# Patient Record
Sex: FEMALE | URBAN - METROPOLITAN AREA
[De-identification: names, ages, dates, MRNs, and addresses within clinical notes are randomized per-mention and may not be internally consistent; named-entity substitution may affect disease eponyms.]

---

## 2018-11-05 PROBLEM — Z00.00 ENCOUNTER FOR PREVENTIVE HEALTH EXAMINATION: Status: ACTIVE | Noted: 2018-11-05

## 2018-11-21 ENCOUNTER — EMERGENCY (EMERGENCY)
Facility: HOSPITAL | Age: 53
LOS: 1 days | Discharge: HOME | End: 2018-11-21
Attending: EMERGENCY MEDICINE
Payer: COMMERCIAL

## 2018-11-21 VITALS
WEIGHT: 214.95 LBS | DIASTOLIC BLOOD PRESSURE: 74 MMHG | RESPIRATION RATE: 18 BRPM | HEART RATE: 70 BPM | HEIGHT: 64 IN | SYSTOLIC BLOOD PRESSURE: 119 MMHG | OXYGEN SATURATION: 97 % | TEMPERATURE: 98 F

## 2018-11-21 VITALS
SYSTOLIC BLOOD PRESSURE: 132 MMHG | RESPIRATION RATE: 18 BRPM | TEMPERATURE: 98 F | OXYGEN SATURATION: 100 % | HEART RATE: 62 BPM | DIASTOLIC BLOOD PRESSURE: 79 MMHG

## 2018-11-21 LAB
ALBUMIN SERPL ELPH-MCNC: 4.6 G/DL — SIGNIFICANT CHANGE UP (ref 3.3–5)
ALP SERPL-CCNC: 85 U/L — SIGNIFICANT CHANGE UP (ref 40–120)
ALT FLD-CCNC: 12 U/L — SIGNIFICANT CHANGE UP (ref 10–45)
ANION GAP SERPL CALC-SCNC: 13 MMOL/L — SIGNIFICANT CHANGE UP (ref 5–17)
APTT BLD: 32.4 SEC — SIGNIFICANT CHANGE UP (ref 27.5–36.3)
AST SERPL-CCNC: 13 U/L — SIGNIFICANT CHANGE UP (ref 10–40)
BASOPHILS # BLD AUTO: 0.1 K/UL — SIGNIFICANT CHANGE UP (ref 0–0.2)
BASOPHILS NFR BLD AUTO: 0.8 % — SIGNIFICANT CHANGE UP (ref 0–2)
BILIRUB SERPL-MCNC: 0.4 MG/DL — SIGNIFICANT CHANGE UP (ref 0.2–1.2)
BUN SERPL-MCNC: 13 MG/DL — SIGNIFICANT CHANGE UP (ref 7–23)
CALCIUM SERPL-MCNC: 9.9 MG/DL — SIGNIFICANT CHANGE UP (ref 8.4–10.5)
CHLORIDE SERPL-SCNC: 104 MMOL/L — SIGNIFICANT CHANGE UP (ref 96–108)
CO2 SERPL-SCNC: 23 MMOL/L — SIGNIFICANT CHANGE UP (ref 22–31)
CREAT SERPL-MCNC: 0.81 MG/DL — SIGNIFICANT CHANGE UP (ref 0.5–1.3)
EOSINOPHIL # BLD AUTO: 0.1 K/UL — SIGNIFICANT CHANGE UP (ref 0–0.5)
EOSINOPHIL NFR BLD AUTO: 1.5 % — SIGNIFICANT CHANGE UP (ref 0–6)
GLUCOSE SERPL-MCNC: 89 MG/DL — SIGNIFICANT CHANGE UP (ref 70–99)
HCT VFR BLD CALC: 41.2 % — SIGNIFICANT CHANGE UP (ref 34.5–45)
HGB BLD-MCNC: 14.3 G/DL — SIGNIFICANT CHANGE UP (ref 11.5–15.5)
INR BLD: 0.99 RATIO — SIGNIFICANT CHANGE UP (ref 0.88–1.16)
LYMPHOCYTES # BLD AUTO: 1.6 K/UL — SIGNIFICANT CHANGE UP (ref 1–3.3)
LYMPHOCYTES # BLD AUTO: 21.1 % — SIGNIFICANT CHANGE UP (ref 13–44)
MCHC RBC-ENTMCNC: 28.1 PG — SIGNIFICANT CHANGE UP (ref 27–34)
MCHC RBC-ENTMCNC: 34.7 GM/DL — SIGNIFICANT CHANGE UP (ref 32–36)
MCV RBC AUTO: 81.2 FL — SIGNIFICANT CHANGE UP (ref 80–100)
MONOCYTES # BLD AUTO: 0.6 K/UL — SIGNIFICANT CHANGE UP (ref 0–0.9)
MONOCYTES NFR BLD AUTO: 8.2 % — SIGNIFICANT CHANGE UP (ref 2–14)
NEUTROPHILS # BLD AUTO: 5.2 K/UL — SIGNIFICANT CHANGE UP (ref 1.8–7.4)
NEUTROPHILS NFR BLD AUTO: 68.5 % — SIGNIFICANT CHANGE UP (ref 43–77)
PLATELET # BLD AUTO: 210 K/UL — SIGNIFICANT CHANGE UP (ref 150–400)
POTASSIUM SERPL-MCNC: 4.2 MMOL/L — SIGNIFICANT CHANGE UP (ref 3.5–5.3)
POTASSIUM SERPL-SCNC: 4.2 MMOL/L — SIGNIFICANT CHANGE UP (ref 3.5–5.3)
PROT SERPL-MCNC: 7.5 G/DL — SIGNIFICANT CHANGE UP (ref 6–8.3)
PROTHROM AB SERPL-ACNC: 11.3 SEC — SIGNIFICANT CHANGE UP (ref 10–12.9)
RBC # BLD: 5.07 M/UL — SIGNIFICANT CHANGE UP (ref 3.8–5.2)
RBC # FLD: 12.9 % — SIGNIFICANT CHANGE UP (ref 10.3–14.5)
SODIUM SERPL-SCNC: 140 MMOL/L — SIGNIFICANT CHANGE UP (ref 135–145)
WBC # BLD: 7.6 K/UL — SIGNIFICANT CHANGE UP (ref 3.8–10.5)
WBC # FLD AUTO: 7.6 K/UL — SIGNIFICANT CHANGE UP (ref 3.8–10.5)

## 2018-11-21 PROCEDURE — 71250 CT THORAX DX C-: CPT

## 2018-11-21 PROCEDURE — 85730 THROMBOPLASTIN TIME PARTIAL: CPT

## 2018-11-21 PROCEDURE — 74176 CT ABD & PELVIS W/O CONTRAST: CPT | Mod: 26

## 2018-11-21 PROCEDURE — 85610 PROTHROMBIN TIME: CPT

## 2018-11-21 PROCEDURE — 72125 CT NECK SPINE W/O DYE: CPT

## 2018-11-21 PROCEDURE — 80053 COMPREHEN METABOLIC PANEL: CPT

## 2018-11-21 PROCEDURE — 70450 CT HEAD/BRAIN W/O DYE: CPT | Mod: 26

## 2018-11-21 PROCEDURE — 85027 COMPLETE CBC AUTOMATED: CPT

## 2018-11-21 PROCEDURE — 71250 CT THORAX DX C-: CPT | Mod: 26

## 2018-11-21 PROCEDURE — 72125 CT NECK SPINE W/O DYE: CPT | Mod: 26

## 2018-11-21 PROCEDURE — 99284 EMERGENCY DEPT VISIT MOD MDM: CPT | Mod: 25

## 2018-11-21 PROCEDURE — 70450 CT HEAD/BRAIN W/O DYE: CPT

## 2018-11-21 PROCEDURE — 74176 CT ABD & PELVIS W/O CONTRAST: CPT

## 2018-11-21 PROCEDURE — 99284 EMERGENCY DEPT VISIT MOD MDM: CPT

## 2018-11-21 RX ORDER — IBUPROFEN 200 MG
600 TABLET ORAL ONCE
Qty: 0 | Refills: 0 | Status: COMPLETED | OUTPATIENT
Start: 2018-11-21 | End: 2018-11-21

## 2018-11-21 RX ADMIN — Medication 600 MILLIGRAM(S): at 19:12

## 2018-11-21 NOTE — ED PROVIDER NOTE - CARE PLAN
Principal Discharge DX:	Abdominal pain  Secondary Diagnosis:	Back pain  Secondary Diagnosis:	Fall, initial encounter

## 2018-11-21 NOTE — ED PROVIDER NOTE - OBJECTIVE STATEMENT
53F presents with back pain and abdominal pain s/p fall down 6 steps.  Patient reports fall occurred at 6am when she was going down the stairs to go to work.  Patient reports she slipped backward and landed on her left side and upper back.  Patient reports dyspnea ever since fall which is pleuritic. Patient reports neck pain as well.  Patient does not think she hit her head but doesn't recall.  Patient denies changes in vision, weakness, numbness, tingling. 53F presents with back pain and abdominal pain s/p fall down 6 steps.  Patient reports fall occurred at 6am when she was going down the stairs to go to work.  Patient reports she slipped backward and landed on her left side and upper back.  Abdominal pain is located to left upper quadrant.  Patient reports she took Ibuprofen with minimal relief.  Patient reports dyspnea ever since fall which is pleuritic. Patient reports neck pain as well.  Patient does not think she hit her head but doesn't recall.  Patient denies changes in vision, weakness, numbness, tingling.

## 2018-11-21 NOTE — ED ADULT NURSE NOTE - OBJECTIVE STATEMENT
53 y.o. Female presents to the ED from home for mechanical fall this morning at approx 0630am. A&Ox3. Hx pneumonia of L lung dx in July. As per EMS, pt slipped and fell down 4 steps on her back this morning. Denies LOC and hitting head. Pt c/o mid back pain, L side pain, coccyx pain - tender. No ecchymosis noted. Neuro intact. +ROM in all four extremities. Pt took 2 advils around 7am - relief. Pt is in no current distress. Comfort and safety provided. Will continue to monitor.

## 2018-11-21 NOTE — ED ADULT TRIAGE NOTE - CHIEF COMPLAINT QUOTE
fall backwards down 4 steps around 6AM today. Unwitnessed, unknown LOC. No head injury. Patient c/o back pain

## 2018-11-21 NOTE — ED ADULT NURSE NOTE - NSIMPLEMENTINTERV_GEN_ALL_ED
Implemented All Fall Risk Interventions:  [place] to call system. Call bell, personal items and telephone within reach. Instruct patient to call for assistance. Room bathroom lighting operational. Non-slip footwear when patient is off stretcher. Physically safe environment: no spills, clutter or unnecessary equipment. Stretcher in lowest position, wheels locked, appropriate side rails in place. Provide visual cue, wrist band, yellow gown, etc. Monitor gait and stability. Monitor for mental status changes and reorient to person, place, and time. Review medications for side effects contributing to fall risk. Reinforce activity limits and safety measures with patient and family.

## 2018-11-21 NOTE — ED PROVIDER NOTE - MEDICAL DECISION MAKING DETAILS
53F presents with upper back pain, neck pain, abdominal pain s/p fall.  ?LOC.  CT Head, cspine, chest and abd.  LAbs.

## 2018-11-21 NOTE — ED ADULT NURSE REASSESSMENT NOTE - NS ED NURSE REASSESS COMMENT FT1
Pt is in no current distress. Comfort and safety provided. Awaiting radiology results. Will continue to monitor. Pt refused tylenol and motrin. Pt is in no current distress. Comfort and safety provided. Awaiting radiology results. Will continue to monitor.

## 2018-11-21 NOTE — ED PROVIDER NOTE - ATTENDING CONTRIBUTION TO CARE
52 y/o female with the above documented history and HPI who on exam appears well and comfortable. VSs noted, head NC/AT s/ preauricular ecchymosis or otorrhea, face s/ periorbital ecchymosis, rhinorrhea or epistaxis, EOMsI, neck in c-collar c/ some right paraspinal cervical tenderness and lower c-spine ttp in the midline s/ palpable bony deformity, lungs CTA, chest wall s/ ecchymosis or flail c/ non-focal ttp along its left posterolateral and mid to lower aspect s/ ecchymosis, flail or palpable bony deformity, cardiac sounds s/ audible m/r/g, abdomen soft, NT/ND s/ ecchymosis, back s/ ecchymosis or midline ttp, extremities s/ asymmetry c/ FROM X 4 s/ hesitation or discomfort, skin s/ ecchymosis or laceration and neurologically completely intact. Appropriate screening studies obtained. If unremarkable for emergent abnormality, I anticipate DC. Will follow her studies and treat/dispo accordingly.

## 2018-11-28 ENCOUNTER — NON-APPOINTMENT (OUTPATIENT)
Age: 53
End: 2018-11-28

## 2018-11-28 ENCOUNTER — APPOINTMENT (OUTPATIENT)
Dept: CARDIOLOGY | Facility: CLINIC | Age: 53
End: 2018-11-28
Payer: COMMERCIAL

## 2018-11-28 VITALS
WEIGHT: 211 LBS | HEIGHT: 64.17 IN | BODY MASS INDEX: 36.02 KG/M2 | DIASTOLIC BLOOD PRESSURE: 85 MMHG | RESPIRATION RATE: 13 BRPM | HEART RATE: 76 BPM | SYSTOLIC BLOOD PRESSURE: 122 MMHG | OXYGEN SATURATION: 99 % | TEMPERATURE: 98 F

## 2018-11-28 DIAGNOSIS — J18.9 PNEUMONIA, UNSPECIFIED ORGANISM: ICD-10-CM

## 2018-11-28 DIAGNOSIS — Z82.49 FAMILY HISTORY OF ISCHEMIC HEART DISEASE AND OTHER DISEASES OF THE CIRCULATORY SYSTEM: ICD-10-CM

## 2018-11-28 PROCEDURE — 93000 ELECTROCARDIOGRAM COMPLETE: CPT

## 2018-11-28 PROCEDURE — 99204 OFFICE O/P NEW MOD 45 MIN: CPT | Mod: 25

## 2018-12-04 ENCOUNTER — NON-APPOINTMENT (OUTPATIENT)
Age: 53
End: 2018-12-04

## 2018-12-04 LAB
APO A-I SERPL-MCNC: 152.2 MG/DL
APO A-I/APO B SERPL: 0.92 RATIO
APO B SERPL-MCNC: 140 MG/DL
APO LP(A) SERPL-MCNC: 225 NMOL/L
CHOLEST SERPL-MCNC: 271 MG/DL
CHOLEST/HDLC SERPL: 5 RATIO
HDLC SERPL-MCNC: 54 MG/DL
LDLC SERPL CALC-MCNC: 194 MG/DL
TRIGL SERPL-MCNC: 115 MG/DL

## 2018-12-04 NOTE — HISTORY OF PRESENT ILLNESS
[FreeTextEntry1] : Ms. Dick is a 53 year old female with hyperlipidemia who presents for a second opinion for cardiology evaluation given her chest pain and dyspnea. Over the past three years she has experienced exertional dyspnea and worsening effort tolerance. She gets out of breath with walking, especially when walking up the stairs, and experiences associated chest pressure that she describes as a heaviness. Her symptoms were worsened on July 3rd when she developed pneumonia. She continues to experience symptoms of pneumonia. She recently had a chest CT that showed atherosclerotic calcifications of the vessels. She followed up with a cardiologist, Dr. Miller, where she had an echocardiogram, exercise stress test, and bloodwork done within the past month. As per patient these tests were all normal except for one blood test that was "borderline positive for a clot". She had a lower extremity Doppler that was negative as well. She denies any palpitations or dizziness. She reports frequent headaches.

## 2018-12-04 NOTE — DISCUSSION/SUMMARY
[FreeTextEntry1] : IMPRESSION: Ms. Dick is a 53 year old female with hyperlipidemia who presents for cardiology evaluation for evaluation of chest pain and dyspnea.\par \par PLAN:\par 1. She recently had several cardiac tests, the results of which she was asked to obtain for us for  review.\par 2. She states that her cholesterol was previously elevated and she has requested a repeat lipid panel. I will also be checking apolipoproteins and a Lipoprotein A level. She will continue on a low cholesterol diet. Given that she was found to have vascular calcifications, she will likely benefit from being started on a statin depending on the results of her repeat cholesterol testing. As long as no contraindications, she should be on ASA 81mg daily given her vascular calcifications.\par 3. Given her pneumonia and shortness of breath she is requesting to check a D-dimer. I have advised her to follow up with the pulmonologist.\par 4. Should she require further imaging when she sees the new Pulmonologist, she may also benefit from a cardiac CT, if she is sent for a repeat Chest CT.\par 5. She can either follow up with me in 2 months or with Dr. Miller.

## 2018-12-04 NOTE — REVIEW OF SYSTEMS
[Headache] : headache [Dyspnea on exertion] : dyspnea during exertion [Chest Pain] : chest pain [Cough] : cough [Negative] : Heme/Lymph [Palpitations] : no palpitations

## 2018-12-04 NOTE — PHYSICAL EXAM
[General Appearance - Well Developed] : well developed [Normal Appearance] : normal appearance [General Appearance - Well Nourished] : well nourished [No Deformities] : no deformities [Normal Conjunctiva] : the conjunctiva exhibited no abnormalities [Normal Oral Mucosa] : normal oral mucosa [No Oral Pallor] : no oral pallor [No Oral Cyanosis] : no oral cyanosis [Normal Jugular Venous A Waves Present] : normal jugular venous A waves present [Normal Jugular Venous V Waves Present] : normal jugular venous V waves present [Normal Rate] : normal [Rhythm Regular] : regular [Normal S1] : normal S1 [Normal S2] : normal S2 [No Murmur] : no murmurs heard [No Pitting Edema] : no pitting edema present [Respiration, Rhythm And Depth] : normal respiratory rhythm and effort [Exaggerated Use Of Accessory Muscles For Inspiration] : no accessory muscle use [Auscultation Breath Sounds / Voice Sounds] : lungs were clear to auscultation bilaterally [Abdomen Soft] : soft [Abdomen Tenderness] : non-tender [Abnormal Walk] : normal gait [Nail Clubbing] : no clubbing of the fingernails [Cyanosis, Localized] : no localized cyanosis [Petechial Hemorrhages (___cm)] : no petechial hemorrhages [Skin Color & Pigmentation] : normal skin color and pigmentation [] : no rash [Skin Lesions] : no skin lesions [Oriented To Time, Place, And Person] : oriented to person, place, and time [Affect] : the affect was normal [Mood] : the mood was normal [No Anxiety] : not feeling anxious [Well Groomed] : well groomed [General Appearance - In No Acute Distress] : no acute distress [FreeTextEntry1] : Extraocular muscles intact. Anicteric sclerae. [Right Carotid Bruit] : no bruit heard over the right carotid [Left Carotid Bruit] : no bruit heard over the left carotid [Bruit] : no bruit heard [Bowel Sounds] : normal bowel sounds [No Skin Ulcers] : no skin ulcer

## 2018-12-05 ENCOUNTER — APPOINTMENT (OUTPATIENT)
Dept: PULMONOLOGY | Facility: CLINIC | Age: 53
End: 2018-12-05

## 2018-12-07 LAB — DEPRECATED D DIMER PPP IA-ACNC: 190 NG/ML DDU

## 2019-01-31 ENCOUNTER — APPOINTMENT (OUTPATIENT)
Dept: CARDIOLOGY | Facility: CLINIC | Age: 54
End: 2019-01-31

## 2019-04-11 ENCOUNTER — NON-APPOINTMENT (OUTPATIENT)
Age: 54
End: 2019-04-11

## 2019-04-11 ENCOUNTER — APPOINTMENT (OUTPATIENT)
Dept: CARDIOLOGY | Facility: CLINIC | Age: 54
End: 2019-04-11
Payer: COMMERCIAL

## 2019-04-11 VITALS
HEART RATE: 82 BPM | RESPIRATION RATE: 13 BRPM | WEIGHT: 208 LBS | HEIGHT: 64.17 IN | DIASTOLIC BLOOD PRESSURE: 82 MMHG | BODY MASS INDEX: 35.51 KG/M2 | SYSTOLIC BLOOD PRESSURE: 121 MMHG | OXYGEN SATURATION: 97 %

## 2019-04-11 DIAGNOSIS — F32.9 MAJOR DEPRESSIVE DISORDER, SINGLE EPISODE, UNSPECIFIED: ICD-10-CM

## 2019-04-11 DIAGNOSIS — R94.31 ABNORMAL ELECTROCARDIOGRAM [ECG] [EKG]: ICD-10-CM

## 2019-04-11 PROCEDURE — 99214 OFFICE O/P EST MOD 30 MIN: CPT | Mod: 25

## 2019-04-11 PROCEDURE — 93000 ELECTROCARDIOGRAM COMPLETE: CPT

## 2019-04-11 RX ORDER — ATORVASTATIN CALCIUM 20 MG/1
20 TABLET, FILM COATED ORAL
Qty: 60 | Refills: 0 | Status: DISCONTINUED | COMMUNITY
Start: 2018-12-07 | End: 2019-04-11

## 2019-04-11 NOTE — DISCUSSION/SUMMARY
[FreeTextEntry1] : IMPRESSION: Ms. Dick is a 53 year old female with hyperlipidemia who presents for follow up of hyperlipidemia.\par \par PLAN:\par 1. She calixto abnormal ECG and given her episodes of chest tightness, I asked her to schedule an echocardiogram to evaluate for any segmental wall motion abnormalities. We can also consider cardiac CT in the future.\par 2. She was unable to tolerate Lipitor, however, given her very elevated cholesterol I feel that she needs to be on a statin and thus will try her on Crestor 10 mg daily. She will also take ASA 81mg daily given her vascular calcifications.\par 3. Given her depression over her son's recent accident, I have referred her to either see her PCP or a Psychiatrist.\par 4. She will follow up with me in 6 weeks or sooner should she experience any cardiac symptoms in the interim.
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
## 2019-04-11 NOTE — PHYSICAL EXAM
[General Appearance - Well Developed] : well developed [Well Groomed] : well groomed [Normal Appearance] : normal appearance [No Deformities] : no deformities [General Appearance - Well Nourished] : well nourished [Normal Conjunctiva] : the conjunctiva exhibited no abnormalities [General Appearance - In No Acute Distress] : no acute distress [Normal Oral Mucosa] : normal oral mucosa [No Oral Pallor] : no oral pallor [Normal Jugular Venous A Waves Present] : normal jugular venous A waves present [No Oral Cyanosis] : no oral cyanosis [Normal Jugular Venous V Waves Present] : normal jugular venous V waves present [Respiration, Rhythm And Depth] : normal respiratory rhythm and effort [Exaggerated Use Of Accessory Muscles For Inspiration] : no accessory muscle use [Auscultation Breath Sounds / Voice Sounds] : lungs were clear to auscultation bilaterally [Bowel Sounds] : normal bowel sounds [Abdomen Soft] : soft [Abdomen Tenderness] : non-tender [Nail Clubbing] : no clubbing of the fingernails [Abnormal Walk] : normal gait [Cyanosis, Localized] : no localized cyanosis [Petechial Hemorrhages (___cm)] : no petechial hemorrhages [Skin Lesions] : no skin lesions [] : no rash [Skin Color & Pigmentation] : normal skin color and pigmentation [No Skin Ulcers] : no skin ulcer [Oriented To Time, Place, And Person] : oriented to person, place, and time [Affect] : the affect was normal [Mood] : the mood was normal [Normal Rate] : normal [Normal S1] : normal S1 [Rhythm Regular] : regular [Normal S2] : normal S2 [No Murmur] : no murmurs heard [No Pitting Edema] : no pitting edema present [FreeTextEntry1] : Extraocular muscles intact. Anicteric sclerae. [Right Carotid Bruit] : no bruit heard over the right carotid [Left Carotid Bruit] : no bruit heard over the left carotid [Bruit] : no bruit heard

## 2019-04-22 ENCOUNTER — APPOINTMENT (OUTPATIENT)
Dept: CARDIOLOGY | Facility: CLINIC | Age: 54
End: 2019-04-22
Payer: COMMERCIAL

## 2019-04-22 PROCEDURE — 93306 TTE W/DOPPLER COMPLETE: CPT

## 2019-04-30 ENCOUNTER — APPOINTMENT (OUTPATIENT)
Dept: INTERNAL MEDICINE | Facility: CLINIC | Age: 54
End: 2019-04-30
Payer: COMMERCIAL

## 2019-04-30 VITALS
DIASTOLIC BLOOD PRESSURE: 82 MMHG | RESPIRATION RATE: 14 BRPM | TEMPERATURE: 97.5 F | HEIGHT: 64.17 IN | SYSTOLIC BLOOD PRESSURE: 128 MMHG | OXYGEN SATURATION: 98 % | HEART RATE: 51 BPM

## 2019-04-30 PROCEDURE — 99204 OFFICE O/P NEW MOD 45 MIN: CPT
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
## 2019-04-30 NOTE — ASSESSMENT
[FreeTextEntry1] : anxiety/depression/insomnia\par start Lexapro 10mg po daily\par Xanax 0.25mg qhs prn\par referred to psych\par \par f/u in 4 weeks

## 2019-04-30 NOTE — PHYSICAL EXAM
[No Acute Distress] : no acute distress [Well Nourished] : well nourished [Well Developed] : well developed [Well-Appearing] : well-appearing [Normal Sclera/Conjunctiva] : normal sclera/conjunctiva [Normal Outer Ear/Nose] : the outer ears and nose were normal in appearance [Normal Oropharynx] : the oropharynx was normal [No JVD] : no jugular venous distention [No Respiratory Distress] : no respiratory distress  [Clear to Auscultation] : lungs were clear to auscultation bilaterally [No Accessory Muscle Use] : no accessory muscle use [Normal Rate] : normal rate  [Regular Rhythm] : with a regular rhythm [Normal S1, S2] : normal S1 and S2 [No Murmur] : no murmur heard [No Edema] : there was no peripheral edema [Soft] : abdomen soft [Non Tender] : non-tender [Non-distended] : non-distended [Normal Posterior Cervical Nodes] : no posterior cervical lymphadenopathy [Normal Anterior Cervical Nodes] : no anterior cervical lymphadenopathy [No CVA Tenderness] : no CVA  tenderness [No Spinal Tenderness] : no spinal tenderness [No Joint Swelling] : no joint swelling [Grossly Normal Strength/Tone] : grossly normal strength/tone [No Rash] : no rash [Normal Gait] : normal gait [Coordination Grossly Intact] : coordination grossly intact [No Focal Deficits] : no focal deficits [Speech Grossly Normal] : speech grossly normal [Alert and Oriented x3] : oriented to person, place, and time [Normal Insight/Judgement] : insight and judgment were intact

## 2019-05-22 ENCOUNTER — APPOINTMENT (OUTPATIENT)
Dept: INTERNAL MEDICINE | Facility: CLINIC | Age: 54
End: 2019-05-22
Payer: COMMERCIAL

## 2019-05-22 VITALS
HEIGHT: 64.17 IN | HEART RATE: 87 BPM | RESPIRATION RATE: 12 BRPM | TEMPERATURE: 98.3 F | BODY MASS INDEX: 35.17 KG/M2 | OXYGEN SATURATION: 98 % | SYSTOLIC BLOOD PRESSURE: 105 MMHG | WEIGHT: 206 LBS | DIASTOLIC BLOOD PRESSURE: 74 MMHG

## 2019-05-22 PROCEDURE — 99214 OFFICE O/P EST MOD 30 MIN: CPT

## 2019-05-29 ENCOUNTER — APPOINTMENT (OUTPATIENT)
Dept: CARDIOLOGY | Facility: CLINIC | Age: 54
End: 2019-05-29

## 2019-06-09 NOTE — ASSESSMENT
[FreeTextEntry1] : anxiety/depression/insomnia\par cont Lexapro 10mg po daily, Xanax 0.25mg qhs prn\par cont weekly psychotherapy sessions\par pt to schedule kishan't with psychiatry\par \par f/u in 4 weeks

## 2019-06-09 NOTE — HISTORY OF PRESENT ILLNESS
[de-identified] : 52 yo female, with hx of anxiety/depression/insomnia, presents for follow up visit\par She was started on Lexapro 10mg daily and Xanax 0.25mg prn. Says the medicine has been helping her\par She is also seeing a psychotherapist every Saturday\par

## 2019-08-27 ENCOUNTER — EMERGENCY (EMERGENCY)
Facility: HOSPITAL | Age: 54
LOS: 1 days | Discharge: HOME | End: 2019-08-27
Admitting: EMERGENCY MEDICINE
Payer: COMMERCIAL

## 2019-08-27 VITALS
RESPIRATION RATE: 16 BRPM | OXYGEN SATURATION: 99 % | HEART RATE: 85 BPM | TEMPERATURE: 98 F | SYSTOLIC BLOOD PRESSURE: 123 MMHG | DIASTOLIC BLOOD PRESSURE: 70 MMHG

## 2019-08-27 PROCEDURE — 99282 EMERGENCY DEPT VISIT SF MDM: CPT

## 2019-08-27 NOTE — ED PROVIDER NOTE - PATIENT PORTAL LINK FT
You can access the FollowMyHealth Patient Portal offered by [facility] by registering at the following website: [email or web address]. By joining [facility]’s FollowMyHealth portal, you will also be able to view your health information using other applications (apps) compatible with our system.
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
## 2019-08-27 NOTE — ED PROVIDER NOTE - CLINICAL SUMMARY MEDICAL DECISION MAKING FREE TEXT BOX
exposure to unknown fluid possibly urine with heavy irrigation and wash- no evidence of infection, low exposure risk to HIV/Hep- no PEP warranted, PCP follow up within the week for further testing at his/her discretion
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
## 2019-08-27 NOTE — ED PROVIDER NOTE - NSFOLLOWUPINSTRUCTIONS_ED_ALL_ED_FT
Follow up with your PMD within 48-72 hrs.  Take all of your medications as previously prescribed.   Worsening, continued or ANY new concerning symptoms return to the emergency department.

## 2019-10-24 PROBLEM — E78.5 HYPERLIPIDEMIA, UNSPECIFIED: Chronic | Status: ACTIVE | Noted: 2019-08-27

## 2019-11-18 ENCOUNTER — APPOINTMENT (OUTPATIENT)
Dept: INTERNAL MEDICINE | Facility: CLINIC | Age: 54
End: 2019-11-18

## 2019-12-09 ENCOUNTER — APPOINTMENT (OUTPATIENT)
Dept: INTERNAL MEDICINE | Facility: CLINIC | Age: 54
End: 2019-12-09

## 2020-01-08 ENCOUNTER — APPOINTMENT (OUTPATIENT)
Dept: INTERNAL MEDICINE | Facility: CLINIC | Age: 55
End: 2020-01-08
Payer: COMMERCIAL

## 2020-01-08 VITALS
SYSTOLIC BLOOD PRESSURE: 120 MMHG | OXYGEN SATURATION: 95 % | RESPIRATION RATE: 12 BRPM | HEIGHT: 64 IN | HEART RATE: 88 BPM | WEIGHT: 222 LBS | DIASTOLIC BLOOD PRESSURE: 74 MMHG | BODY MASS INDEX: 37.9 KG/M2 | TEMPERATURE: 98.6 F

## 2020-01-08 DIAGNOSIS — M54.9 DORSALGIA, UNSPECIFIED: ICD-10-CM

## 2020-01-08 DIAGNOSIS — R11.0 NAUSEA: ICD-10-CM

## 2020-01-08 DIAGNOSIS — G47.00 INSOMNIA, UNSPECIFIED: ICD-10-CM

## 2020-01-08 DIAGNOSIS — F32.9 ANXIETY DISORDER, UNSPECIFIED: ICD-10-CM

## 2020-01-08 DIAGNOSIS — F41.9 ANXIETY DISORDER, UNSPECIFIED: ICD-10-CM

## 2020-01-08 PROCEDURE — 99214 OFFICE O/P EST MOD 30 MIN: CPT

## 2020-01-08 NOTE — HISTORY OF PRESENT ILLNESS
[de-identified] : Ms. ASHLEY HARRIS is a 54 year old female, with hx of insomnia, anxiety, depression,  presents for follow up visit\par She was prescribed Lexapro and Xanax prn in May. She took the Lexapro for only a month. She is also seeing a psychotherapist every week. Says the therapist reccomended she take the lexapro.  Pt says that her anxiety is bad again and now she wants to go back on meds\par She c/o pain in her back on the left side and nausea after she eats for the past year. Says overeating or eating fatty foods makes the pain worse\par \par

## 2020-01-08 NOTE — PHYSICAL EXAM
[Normal Sclera/Conjunctiva] : normal sclera/conjunctiva [Normal Outer Ear/Nose] : the outer ears and nose were normal in appearance [No JVD] : no jugular venous distention [Normal] : no respiratory distress, lungs were clear to auscultation bilaterally and no accessory muscle use [No Edema] : there was no peripheral edema [Soft] : abdomen soft [Non-distended] : non-distended [Non Tender] : non-tender [Normal Posterior Cervical Nodes] : no posterior cervical lymphadenopathy [Normal Anterior Cervical Nodes] : no anterior cervical lymphadenopathy [No Joint Swelling] : no joint swelling [No CVA Tenderness] : no CVA  tenderness [No Focal Deficits] : no focal deficits [Normal Affect] : the affect was normal [Alert and Oriented x3] : oriented to person, place, and time [Normal Gait] : normal gait [Normal Insight/Judgement] : insight and judgment were intact

## 2020-01-08 NOTE — ASSESSMENT
[FreeTextEntry1] : Back pain and nausea after eating R/O gallstones\par referred for US abdomen\par blood work ordered\par \par hx of insomnia, anxiety, depression\par resume lexapro and Xanax prn-renewed today\par cont weekly therapy sessions\par \par Hypercholesterolemia\par pt does not want to take cholesterol meds\par reinforced low chol/low fat diet\par we'll check Lipid panel today \par \par f/u in one week for lab results\par \par

## 2020-01-24 LAB
ALBUMIN SERPL ELPH-MCNC: 4.8 G/DL
ALP BLD-CCNC: 75 U/L
ALT SERPL-CCNC: 15 U/L
ANION GAP SERPL CALC-SCNC: 13 MMOL/L
AST SERPL-CCNC: 14 U/L
BASOPHILS # BLD AUTO: 0.03 K/UL
BASOPHILS NFR BLD AUTO: 0.5 %
BILIRUB SERPL-MCNC: 0.5 MG/DL
BUN SERPL-MCNC: 11 MG/DL
CALCIUM SERPL-MCNC: 9.7 MG/DL
CHLORIDE SERPL-SCNC: 102 MMOL/L
CO2 SERPL-SCNC: 26 MMOL/L
CREAT SERPL-MCNC: 0.89 MG/DL
EOSINOPHIL # BLD AUTO: 0.1 K/UL
EOSINOPHIL NFR BLD AUTO: 1.6 %
ESTIMATED AVERAGE GLUCOSE: 114 MG/DL
GLUCOSE SERPL-MCNC: 88 MG/DL
HBA1C MFR BLD HPLC: 5.6 %
HCT VFR BLD CALC: 41.1 %
HGB BLD-MCNC: 13.1 G/DL
IMM GRANULOCYTES NFR BLD AUTO: 0.2 %
LYMPHOCYTES # BLD AUTO: 2.28 K/UL
LYMPHOCYTES NFR BLD AUTO: 36.1 %
MAN DIFF?: NORMAL
MCHC RBC-ENTMCNC: 27.1 PG
MCHC RBC-ENTMCNC: 31.9 GM/DL
MCV RBC AUTO: 85.1 FL
MONOCYTES # BLD AUTO: 0.36 K/UL
MONOCYTES NFR BLD AUTO: 5.7 %
NEUTROPHILS # BLD AUTO: 3.54 K/UL
NEUTROPHILS NFR BLD AUTO: 55.9 %
PLATELET # BLD AUTO: 225 K/UL
POTASSIUM SERPL-SCNC: 3.9 MMOL/L
PROT SERPL-MCNC: 7.4 G/DL
RBC # BLD: 4.83 M/UL
RBC # FLD: 12.4 %
SODIUM SERPL-SCNC: 141 MMOL/L
TSH SERPL-ACNC: 1.32 UIU/ML
WBC # FLD AUTO: 6.32 K/UL

## 2020-01-28 LAB
CHOLEST SERPL-MCNC: 239 MG/DL
CHOLEST/HDLC SERPL: 6 RATIO
HDLC SERPL-MCNC: 40 MG/DL
LDLC SERPL CALC-MCNC: 161 MG/DL
TRIGL SERPL-MCNC: 192 MG/DL

## 2021-06-02 ENCOUNTER — APPOINTMENT (OUTPATIENT)
Dept: CARDIOLOGY | Facility: CLINIC | Age: 56
End: 2021-06-02
Payer: COMMERCIAL

## 2021-06-02 ENCOUNTER — NON-APPOINTMENT (OUTPATIENT)
Age: 56
End: 2021-06-02

## 2021-06-02 VITALS
RESPIRATION RATE: 12 BRPM | DIASTOLIC BLOOD PRESSURE: 80 MMHG | BODY MASS INDEX: 39.09 KG/M2 | HEART RATE: 86 BPM | HEIGHT: 64 IN | OXYGEN SATURATION: 98 % | TEMPERATURE: 97.1 F | WEIGHT: 229 LBS | SYSTOLIC BLOOD PRESSURE: 119 MMHG

## 2021-06-02 DIAGNOSIS — I35.0 NONRHEUMATIC AORTIC (VALVE) STENOSIS: ICD-10-CM

## 2021-06-02 PROCEDURE — 93000 ELECTROCARDIOGRAM COMPLETE: CPT

## 2021-06-02 PROCEDURE — 99214 OFFICE O/P EST MOD 30 MIN: CPT | Mod: 25

## 2021-06-02 RX ORDER — ALPRAZOLAM 0.25 MG/1
0.25 TABLET ORAL
Qty: 30 | Refills: 0 | Status: DISCONTINUED | COMMUNITY
Start: 2019-04-30 | End: 2021-06-02

## 2021-06-02 RX ORDER — ESCITALOPRAM OXALATE 10 MG/1
10 TABLET ORAL
Qty: 30 | Refills: 3 | Status: DISCONTINUED | COMMUNITY
Start: 2019-04-30 | End: 2021-06-02

## 2021-06-02 RX ORDER — ALBUTEROL SULFATE 90 UG/1
108 (90 BASE) AEROSOL, METERED RESPIRATORY (INHALATION)
Refills: 0 | Status: DISCONTINUED | COMMUNITY
Start: 2018-11-28 | End: 2021-06-02

## 2021-06-02 RX ORDER — FLUTICASONE FUROATE, UMECLIDINIUM BROMIDE AND VILANTEROL TRIFENATATE 100; 62.5; 25 UG/1; UG/1; UG/1
100-62.5-25 POWDER RESPIRATORY (INHALATION)
Refills: 0 | Status: DISCONTINUED | COMMUNITY
Start: 2018-11-28 | End: 2021-06-02

## 2021-06-06 NOTE — REVIEW OF SYSTEMS
[Feeling Fatigued] : feeling fatigued [Negative] : Heme/Lymph [Dyspnea on exertion] : dyspnea during exertion [Chest Discomfort] : chest discomfort [Palpitations] : palpitations [Memory Lapses Or Loss] : memory lapses or loss [Under Stress] : under stress [FreeTextEntry7] : see HPI
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
## 2021-06-06 NOTE — CARDIOLOGY SUMMARY
[de-identified] : 6/2/2021: Sinus Rhythm at 78 bpm with low voltage QRS, old inferior infarct and poor R wave progression [de-identified] : 4/22/2019: EF 60%. Mild mitral regurgitation. Mild-moderate aortic stenosis, valve area = 1.5 sqcm. Concentric remodeling. Endocardium not well visualized, grossly normal LV systolic function. PASP = 28 mmHg. Mild tricuspid regurgitation.

## 2021-06-06 NOTE — DISCUSSION/SUMMARY
[FreeTextEntry1] : IMPRESSION: Ms. Dick is a 55 year old female with a history of hyperlipidemia who presents for evaluation of chest discomfort.\par \par PLAN:\par 1. She will schedule an echocardiogram to further evaluate her aortic stenosis and episodes of chest discomfort.\par 2. Given her abnormal ECG done today, her history of CAD and episodes of chest tightness, I asked her to schedule a cardiac CT to evaluate for obstructive disease that may be contributing to her symptoms. \par 3. She states that she recently had blood work done with you and her cholesterol was elevated and you restarted her back on Crestor 10 mg daily, along with a low fat/ low cholesterol diet. She will also take ASA 81 mg daily given her vascular calcifications.\par 4. She will follow up with me after her cardiac testing or sooner should she experience any cardiac symptoms in the interim.

## 2021-06-28 ENCOUNTER — APPOINTMENT (OUTPATIENT)
Dept: CARDIOLOGY | Facility: CLINIC | Age: 56
End: 2021-06-28

## 2021-08-04 ENCOUNTER — APPOINTMENT (OUTPATIENT)
Dept: CARDIOLOGY | Facility: CLINIC | Age: 56
End: 2021-08-04

## 2021-09-15 NOTE — ED PROVIDER NOTE - CPE EDP NEURO NORM
Patient has skipped diuretic lately. Nurse Meghan Oconnor there yesterday to visit. His family reports he has been in chair not moving for days and sleeps there too, legs NOT elevated. Wonders why his legs are swollen.  Legs swollen with areas os weepiness, some re normal...

## 2021-09-29 ENCOUNTER — APPOINTMENT (OUTPATIENT)
Dept: INTERNAL MEDICINE | Facility: CLINIC | Age: 56
End: 2021-09-29
Payer: COMMERCIAL

## 2021-09-29 VITALS
WEIGHT: 192 LBS | BODY MASS INDEX: 32.78 KG/M2 | DIASTOLIC BLOOD PRESSURE: 80 MMHG | SYSTOLIC BLOOD PRESSURE: 125 MMHG | TEMPERATURE: 97.5 F | HEART RATE: 100 BPM | RESPIRATION RATE: 12 BRPM | OXYGEN SATURATION: 99 % | HEIGHT: 64 IN

## 2021-09-29 DIAGNOSIS — R20.0 ANESTHESIA OF SKIN: ICD-10-CM

## 2021-09-29 DIAGNOSIS — D21.4 BENIGN NEOPLASM OF CONNECTIVE AND OTHER SOFT TISSUE OF ABDOMEN: ICD-10-CM

## 2021-09-29 PROCEDURE — 99214 OFFICE O/P EST MOD 30 MIN: CPT

## 2021-10-10 NOTE — PHYSICAL EXAM
[Normal Sclera/Conjunctiva] : normal sclera/conjunctiva [Normal Outer Ear/Nose] : the outer ears and nose were normal in appearance [No JVD] : no jugular venous distention [No Edema] : there was no peripheral edema [Soft] : abdomen soft [Non-distended] : non-distended [Normal Bowel Sounds] : normal bowel sounds [Normal Anterior Cervical Nodes] : no anterior cervical lymphadenopathy [No CVA Tenderness] : no CVA  tenderness [No Joint Swelling] : no joint swelling [No Rash] : no rash [Normal Gait] : normal gait [Speech Grossly Normal] : speech grossly normal [Normal Affect] : the affect was normal [Alert and Oriented x3] : oriented to person, place, and time [Normal Insight/Judgement] : insight and judgment were intact [Normal] : affect was normal and insight and judgment were intact [de-identified] : ventral surgical scar healing well, there is mild  tendrness on palpation around the surgical scar area

## 2021-10-10 NOTE — ASSESSMENT
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

## 2021-10-10 NOTE — HISTORY OF PRESENT ILLNESS
[de-identified] : Ms. 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

## 2022-08-05 ENCOUNTER — RESULT REVIEW (OUTPATIENT)
Age: 57
End: 2022-08-05

## 2022-10-10 NOTE — ED ADULT TRIAGE NOTE - PAIN RATING/NUMBER SCALE (0-10): ACTIVITY
5 Skyrizi Counseling: I discussed with the patient the risks of risankizumab-rzaa including but not limited to immunosuppression, and serious infections.  The patient understands that monitoring is required including a PPD at baseline and must alert us or the primary physician if symptoms of infection or other concerning signs are noted.

## 2024-02-27 ENCOUNTER — APPOINTMENT (OUTPATIENT)
Dept: CARDIOLOGY | Facility: CLINIC | Age: 59
End: 2024-02-27
Payer: COMMERCIAL

## 2024-02-27 ENCOUNTER — NON-APPOINTMENT (OUTPATIENT)
Age: 59
End: 2024-02-27

## 2024-02-27 VITALS
DIASTOLIC BLOOD PRESSURE: 89 MMHG | RESPIRATION RATE: 15 BRPM | SYSTOLIC BLOOD PRESSURE: 141 MMHG | OXYGEN SATURATION: 99 % | HEIGHT: 64 IN | BODY MASS INDEX: 36.88 KG/M2 | WEIGHT: 216 LBS | HEART RATE: 58 BPM

## 2024-02-27 VITALS — DIASTOLIC BLOOD PRESSURE: 84 MMHG | SYSTOLIC BLOOD PRESSURE: 124 MMHG

## 2024-02-27 DIAGNOSIS — G44.89 OTHER HEADACHE SYNDROME: ICD-10-CM

## 2024-02-27 DIAGNOSIS — E78.5 HYPERLIPIDEMIA, UNSPECIFIED: ICD-10-CM

## 2024-02-27 DIAGNOSIS — R07.89 OTHER CHEST PAIN: ICD-10-CM

## 2024-02-27 PROCEDURE — 93000 ELECTROCARDIOGRAM COMPLETE: CPT

## 2024-02-27 PROCEDURE — 99214 OFFICE O/P EST MOD 30 MIN: CPT | Mod: 25

## 2024-02-27 NOTE — HISTORY OF PRESENT ILLNESS
[FreeTextEntry1] : Ms. Dick is a 58 year old female with hyperlipidemia and coronary atherosclerosis who presents for evaluation of hyperlipidemia. She had blood work and her cholesterol was reportedly elevated and she was told to start on a statin, which she has not started. She has had episodes of headaches. She has had episodes of chest discomfort which she feels is from heartburn. She had hematuria when she was in [place] and was started on antibiotics for a UTI. She denies any exertional chest pain, palpitations, and dyspnea at rest. She does mention episodes of headaches.

## 2024-02-27 NOTE — CARDIOLOGY SUMMARY
[de-identified] : 2/22/2024 (done in [facility]): EF= 65%. Normal LV function. Borderline dilated proximal ascending aorta (3.6 cm).    4/22/2019: EF 60%. Mild mitral regurgitation. Mild-moderate aortic stenosis, valve area = 1.5 sqcm. Concentric remodeling. Endocardium not well visualized, grossly normal LV systolic function. PASP = 28 mmHg. Mild tricuspid regurgitation. [de-identified] : 2/27/2024: Sinus Rhythm at 60 bpm

## 2024-02-27 NOTE — PHYSICAL EXAM
[General Appearance - Well Developed] : well developed [Normal Appearance] : normal appearance [Well Groomed] : well groomed [General Appearance - Well Nourished] : well nourished [No Deformities] : no deformities [General Appearance - In No Acute Distress] : no acute distress [Normal Conjunctiva] : the conjunctiva exhibited no abnormalities [Normal Jugular Venous A Waves Present] : normal jugular venous A waves present [Normal Jugular Venous V Waves Present] : normal jugular venous V waves present [Respiration, Rhythm And Depth] : normal respiratory rhythm and effort [Exaggerated Use Of Accessory Muscles For Inspiration] : no accessory muscle use [Auscultation Breath Sounds / Voice Sounds] : lungs were clear to auscultation bilaterally [Abdomen Soft] : soft [Bowel Sounds] : normal bowel sounds [Abdomen Tenderness] : non-tender [Nail Clubbing] : no clubbing of the fingernails [Abnormal Walk] : normal gait [Cyanosis, Localized] : no localized cyanosis [Skin Color & Pigmentation] : normal skin color and pigmentation [Petechial Hemorrhages (___cm)] : no petechial hemorrhages [No Skin Ulcers] : no skin ulcer [] : no rash [Oriented To Time, Place, And Person] : oriented to person, place, and time [Affect] : the affect was normal [Mood] : the mood was normal [Normal Rate] : normal [Rhythm Regular] : regular [Normal S1] : normal S1 [Normal S2] : normal S2 [No Pitting Edema] : no pitting edema present [Normal Oral Mucosa] : normal oral mucosa [No Oral Pallor] : no oral pallor [No Oral Cyanosis] : no oral cyanosis [Gait - Sufficient For Exercise Testing] : the gait was sufficient for exercise testing [I] : a grade 1 [FreeTextEntry1] : Extraocular muscles intact. Anicteric sclerae. [Right Carotid Bruit] : no bruit heard over the right carotid [S3] : no S3 [Left Carotid Bruit] : no bruit heard over the left carotid [Bruit] : no bruit heard

## 2024-02-27 NOTE — DISCUSSION/SUMMARY
[EKG obtained to assist in diagnosis and management of assessed problem(s)] : EKG obtained to assist in diagnosis and management of assessed problem(s) [FreeTextEntry1] : IMPRESSION: Ms. Dick is a 58 year old female with a history of hyperlipidemia and coronary atherosclerosis who presents for evaluation of hyperlipidemia.  PLAN: 1. She was in sinus rhythm on her ECG that was performed today. Her episodes of chest discomfort are atypical. She does not want to have a Cardiac CT to evaluate her coronary atherosclerosis. She has agreed to schedule an exercise stress test. 2. She provided me with blood work that she had most recently in your office that revealed an LDL cholesterol in the 200s and a total cholesterol of 300.  I have asked her to start on rosuvastatin as you had also recommended.  She is very hesitant to start on medications.  I have at the very least asked her to take rosuvastatin 5 mg every other day if she does not want to take it daily.  She states that she will reconsider this. 3.  I have asked her to schedule a carotid Doppler given her headaches. 4.  She will follow-up with me in 3 months or sooner should she experience any new or worsening symptoms in the interim.

## 2024-03-05 RX ORDER — ROSUVASTATIN CALCIUM 10 MG/1
10 TABLET, FILM COATED ORAL
Qty: 90 | Refills: 1 | Status: ACTIVE | COMMUNITY
Start: 2019-04-11 | End: 1900-01-01

## 2024-03-12 ENCOUNTER — APPOINTMENT (OUTPATIENT)
Dept: CARDIOLOGY | Facility: CLINIC | Age: 59
End: 2024-03-12
Payer: COMMERCIAL

## 2024-03-12 PROCEDURE — 93880 EXTRACRANIAL BILAT STUDY: CPT

## 2024-04-30 NOTE — ED ADULT NURSE NOTE - NS ED NURSE LEVEL OF CONSCIOUSNESS SPEECH
[FreeTextEntry1] : - perform foot checks daily  - foot soak in warm soapy water twice a day, dry off and apply topical abx ointment  - if no improvement after 2 days of oral abx, follow up  - find out from new ins if podiatry is covered
Speaking Coherently

## 2024-06-09 ENCOUNTER — NON-APPOINTMENT (OUTPATIENT)
Age: 59
End: 2024-06-09

## 2024-07-02 ENCOUNTER — APPOINTMENT (OUTPATIENT)
Dept: CARDIOLOGY | Facility: CLINIC | Age: 59
End: 2024-07-02

## 2024-07-08 ENCOUNTER — APPOINTMENT (OUTPATIENT)
Dept: CARDIOLOGY | Facility: CLINIC | Age: 59
End: 2024-07-08
Payer: COMMERCIAL

## 2024-07-08 ENCOUNTER — NON-APPOINTMENT (OUTPATIENT)
Age: 59
End: 2024-07-08

## 2024-07-08 VITALS
BODY MASS INDEX: 37.05 KG/M2 | HEIGHT: 64 IN | WEIGHT: 217 LBS | RESPIRATION RATE: 12 BRPM | SYSTOLIC BLOOD PRESSURE: 120 MMHG | OXYGEN SATURATION: 99 % | HEART RATE: 59 BPM | DIASTOLIC BLOOD PRESSURE: 80 MMHG

## 2024-07-08 DIAGNOSIS — E78.5 HYPERLIPIDEMIA, UNSPECIFIED: ICD-10-CM

## 2024-07-08 DIAGNOSIS — R94.31 ABNORMAL ELECTROCARDIOGRAM [ECG] [EKG]: ICD-10-CM

## 2024-07-08 DIAGNOSIS — Z00.00 ENCOUNTER FOR GENERAL ADULT MEDICAL EXAMINATION W/OUT ABNORMAL FINDINGS: ICD-10-CM

## 2024-07-08 PROCEDURE — 93000 ELECTROCARDIOGRAM COMPLETE: CPT

## 2024-07-08 PROCEDURE — 99214 OFFICE O/P EST MOD 30 MIN: CPT | Mod: 25

## 2024-07-08 PROCEDURE — G2211 COMPLEX E/M VISIT ADD ON: CPT | Mod: NC

## 2024-07-09 LAB
ALBUMIN SERPL ELPH-MCNC: 4.9 G/DL
ALP BLD-CCNC: 95 U/L
ALT SERPL-CCNC: 22 U/L
ANION GAP SERPL CALC-SCNC: 15 MMOL/L
APPEARANCE: CLEAR
AST SERPL-CCNC: 22 U/L
BASOPHILS # BLD AUTO: 0.06 K/UL
BASOPHILS NFR BLD AUTO: 1 %
BILIRUB SERPL-MCNC: 0.4 MG/DL
BILIRUBIN URINE: NEGATIVE
BLOOD URINE: NEGATIVE
BUN SERPL-MCNC: 12 MG/DL
CALCIUM SERPL-MCNC: 9.9 MG/DL
CHLORIDE SERPL-SCNC: 104 MMOL/L
CHOLEST SERPL-MCNC: 286 MG/DL
CO2 SERPL-SCNC: 23 MMOL/L
COLOR: YELLOW
CREAT SERPL-MCNC: 0.93 MG/DL
EGFR: 71 ML/MIN/1.73M2
EOSINOPHIL # BLD AUTO: 0.07 K/UL
EOSINOPHIL NFR BLD AUTO: 1.1 %
ESTIMATED AVERAGE GLUCOSE: 114 MG/DL
FOLATE SERPL-MCNC: 17.8 NG/ML
GLUCOSE QUALITATIVE U: NEGATIVE MG/DL
GLUCOSE SERPL-MCNC: 90 MG/DL
HBA1C MFR BLD HPLC: 5.6 %
HCT VFR BLD CALC: 43 %
HCV S/CO RATIO: 0.14 S/CO
HDLC SERPL-MCNC: 58 MG/DL
HGB BLD-MCNC: 13.9 G/DL
IMM GRANULOCYTES NFR BLD AUTO: 0.2 %
KETONES URINE: NEGATIVE MG/DL
LDLC SERPL CALC-MCNC: 205 MG/DL
LEUKOCYTE ESTERASE URINE: NEGATIVE
LYMPHOCYTES # BLD AUTO: 2.08 K/UL
LYMPHOCYTES NFR BLD AUTO: 33.4 %
MCHC RBC-ENTMCNC: 27.7 PG
MCHC RBC-ENTMCNC: 32.3 GM/DL
MCV RBC AUTO: 85.8 FL
MONOCYTES # BLD AUTO: 0.36 K/UL
MONOCYTES NFR BLD AUTO: 5.8 %
NEUTROPHILS # BLD AUTO: 3.65 K/UL
NEUTROPHILS NFR BLD AUTO: 58.5 %
NITRITE URINE: NEGATIVE
NONHDLC SERPL-MCNC: 228 MG/DL
PH URINE: 5
POTASSIUM SERPL-SCNC: 4.4 MMOL/L
PROT SERPL-MCNC: 7.9 G/DL
PROTEIN URINE: NEGATIVE MG/DL
RBC # BLD: 5.01 M/UL
RBC # FLD: 13.8 %
SODIUM SERPL-SCNC: 142 MMOL/L
SPECIFIC GRAVITY URINE: 1.03
TRIGL SERPL-MCNC: 128 MG/DL
TSH SERPL-ACNC: 1.79 UIU/ML
UROBILINOGEN URINE: 0.2 MG/DL
VIT B12 SERPL-MCNC: 516 PG/ML
WBC # FLD AUTO: 6.23 K/UL

## 2024-07-15 ENCOUNTER — APPOINTMENT (OUTPATIENT)
Dept: UROLOGY | Facility: CLINIC | Age: 59
End: 2024-07-15
Payer: COMMERCIAL

## 2024-07-15 VITALS
RESPIRATION RATE: 12 BRPM | HEIGHT: 64 IN | BODY MASS INDEX: 37.9 KG/M2 | SYSTOLIC BLOOD PRESSURE: 127 MMHG | WEIGHT: 222 LBS | TEMPERATURE: 97.3 F | OXYGEN SATURATION: 97 % | HEART RATE: 88 BPM | DIASTOLIC BLOOD PRESSURE: 74 MMHG

## 2024-07-15 DIAGNOSIS — R39.15 URGENCY OF URINATION: ICD-10-CM

## 2024-07-15 DIAGNOSIS — N39.3 STRESS INCONTINENCE (FEMALE) (MALE): ICD-10-CM

## 2024-07-15 DIAGNOSIS — R31.29 OTHER MICROSCOPIC HEMATURIA: ICD-10-CM

## 2024-07-15 PROCEDURE — 99204 OFFICE O/P NEW MOD 45 MIN: CPT

## 2024-07-15 PROCEDURE — G2211 COMPLEX E/M VISIT ADD ON: CPT | Mod: NC

## 2024-07-16 ENCOUNTER — APPOINTMENT (OUTPATIENT)
Dept: CARDIOLOGY | Facility: CLINIC | Age: 59
End: 2024-07-16
Payer: COMMERCIAL

## 2024-07-16 ENCOUNTER — TRANSCRIPTION ENCOUNTER (OUTPATIENT)
Age: 59
End: 2024-07-16

## 2024-07-16 DIAGNOSIS — I49.3 VENTRICULAR PREMATURE DEPOLARIZATION: ICD-10-CM

## 2024-07-16 LAB
APPEARANCE: CLEAR
BACTERIA: NEGATIVE /HPF
BILIRUBIN URINE: NEGATIVE
BLOOD URINE: NEGATIVE
CALCIUM OXALATE CRYSTALS: PRESENT
CAST: 0 /LPF
COLOR: NORMAL
EPITHELIAL CELLS: 0 /HPF
GLUCOSE QUALITATIVE U: NEGATIVE MG/DL
KETONES URINE: ABNORMAL MG/DL
LEUKOCYTE ESTERASE URINE: NEGATIVE
MICROSCOPIC-UA: NORMAL
NITRITE URINE: NEGATIVE
PH URINE: 5.5
PROTEIN URINE: NORMAL MG/DL
RED BLOOD CELLS URINE: NORMAL /HPF
REVIEW: NORMAL
SPECIFIC GRAVITY URINE: >1.03
UROBILINOGEN URINE: 0.2 MG/DL
WHITE BLOOD CELLS URINE: 0 /HPF

## 2024-07-16 PROCEDURE — 93015 CV STRESS TEST SUPVJ I&R: CPT

## 2024-07-17 LAB — URINE CYTOLOGY: NORMAL

## 2024-07-18 ENCOUNTER — NON-APPOINTMENT (OUTPATIENT)
Age: 59
End: 2024-07-18

## 2024-07-18 PROBLEM — I49.3 PVC (PREMATURE VENTRICULAR CONTRACTION): Status: ACTIVE | Noted: 2024-07-18

## 2024-07-18 LAB — BACTERIA UR CULT: NORMAL

## 2024-08-02 ENCOUNTER — APPOINTMENT (OUTPATIENT)
Dept: CARDIOLOGY | Facility: CLINIC | Age: 59
End: 2024-08-02

## 2024-08-12 ENCOUNTER — APPOINTMENT (OUTPATIENT)
Dept: UROLOGY | Facility: CLINIC | Age: 59
End: 2024-08-12

## 2024-08-20 ENCOUNTER — NON-APPOINTMENT (OUTPATIENT)
Age: 59
End: 2024-08-20

## 2025-03-06 NOTE — ED PROVIDER NOTE - DATE/TIME 1
No. LITO screening performed.  STOP BANG Legend: 0-2 = LOW Risk; 3-4 = INTERMEDIATE Risk; 5-8 = HIGH Risk
21-Nov-2018 18:53

## 2025-04-12 ENCOUNTER — TRANSCRIPTION ENCOUNTER (OUTPATIENT)
Age: 60
End: 2025-04-12

## 2025-04-28 ENCOUNTER — EMERGENCY (EMERGENCY)
Facility: HOSPITAL | Age: 60
LOS: 1 days | End: 2025-04-28
Attending: STUDENT IN AN ORGANIZED HEALTH CARE EDUCATION/TRAINING PROGRAM
Payer: COMMERCIAL

## 2025-04-28 VITALS
SYSTOLIC BLOOD PRESSURE: 130 MMHG | DIASTOLIC BLOOD PRESSURE: 81 MMHG | HEIGHT: 64 IN | HEART RATE: 107 BPM | OXYGEN SATURATION: 97 % | TEMPERATURE: 99 F | RESPIRATION RATE: 20 BRPM | WEIGHT: 227.08 LBS

## 2025-04-28 PROCEDURE — 99285 EMERGENCY DEPT VISIT HI MDM: CPT

## 2025-04-29 VITALS
HEART RATE: 84 BPM | SYSTOLIC BLOOD PRESSURE: 117 MMHG | TEMPERATURE: 98 F | DIASTOLIC BLOOD PRESSURE: 77 MMHG | RESPIRATION RATE: 15 BRPM | OXYGEN SATURATION: 96 %

## 2025-04-29 LAB
ALBUMIN SERPL ELPH-MCNC: 4.3 G/DL — SIGNIFICANT CHANGE UP (ref 3.3–5)
ALP SERPL-CCNC: 88 U/L — SIGNIFICANT CHANGE UP (ref 40–120)
ALT FLD-CCNC: 19 U/L — SIGNIFICANT CHANGE UP (ref 10–45)
ANION GAP SERPL CALC-SCNC: 14 MMOL/L — SIGNIFICANT CHANGE UP (ref 5–17)
APPEARANCE UR: CLEAR — SIGNIFICANT CHANGE UP
APTT BLD: 30.9 SEC — SIGNIFICANT CHANGE UP (ref 26.1–36.8)
AST SERPL-CCNC: 11 U/L — SIGNIFICANT CHANGE UP (ref 10–40)
BACTERIA # UR AUTO: NEGATIVE /HPF — SIGNIFICANT CHANGE UP
BASOPHILS # BLD AUTO: 0.03 K/UL — SIGNIFICANT CHANGE UP (ref 0–0.2)
BASOPHILS NFR BLD AUTO: 0.3 % — SIGNIFICANT CHANGE UP (ref 0–2)
BILIRUB SERPL-MCNC: 0.7 MG/DL — SIGNIFICANT CHANGE UP (ref 0.2–1.2)
BILIRUB UR-MCNC: NEGATIVE — SIGNIFICANT CHANGE UP
BUN SERPL-MCNC: 8 MG/DL — SIGNIFICANT CHANGE UP (ref 7–23)
CALCIUM SERPL-MCNC: 9.2 MG/DL — SIGNIFICANT CHANGE UP (ref 8.4–10.5)
CAST: 2 /LPF — SIGNIFICANT CHANGE UP (ref 0–4)
CHLORIDE SERPL-SCNC: 103 MMOL/L — SIGNIFICANT CHANGE UP (ref 96–108)
CO2 SERPL-SCNC: 21 MMOL/L — LOW (ref 22–31)
COLOR SPEC: YELLOW — SIGNIFICANT CHANGE UP
CREAT SERPL-MCNC: 0.9 MG/DL — SIGNIFICANT CHANGE UP (ref 0.5–1.3)
DIFF PNL FLD: NEGATIVE — SIGNIFICANT CHANGE UP
EGFR: 74 ML/MIN/1.73M2 — SIGNIFICANT CHANGE UP
EGFR: 74 ML/MIN/1.73M2 — SIGNIFICANT CHANGE UP
EOSINOPHIL # BLD AUTO: 0.02 K/UL — SIGNIFICANT CHANGE UP (ref 0–0.5)
EOSINOPHIL NFR BLD AUTO: 0.2 % — SIGNIFICANT CHANGE UP (ref 0–6)
FLUAV AG NPH QL: SIGNIFICANT CHANGE UP
FLUBV AG NPH QL: SIGNIFICANT CHANGE UP
GAS PNL BLDV: SIGNIFICANT CHANGE UP
GLUCOSE SERPL-MCNC: 110 MG/DL — HIGH (ref 70–99)
GLUCOSE UR QL: NEGATIVE MG/DL — SIGNIFICANT CHANGE UP
HCT VFR BLD CALC: 40.4 % — SIGNIFICANT CHANGE UP (ref 34.5–45)
HGB BLD-MCNC: 13.3 G/DL — SIGNIFICANT CHANGE UP (ref 11.5–15.5)
IMM GRANULOCYTES NFR BLD AUTO: 0.4 % — SIGNIFICANT CHANGE UP (ref 0–0.9)
INR BLD: 1.07 RATIO — SIGNIFICANT CHANGE UP (ref 0.85–1.16)
KETONES UR-MCNC: NEGATIVE MG/DL — SIGNIFICANT CHANGE UP
LEUKOCYTE ESTERASE UR-ACNC: ABNORMAL
LYMPHOCYTES # BLD AUTO: 1.08 K/UL — SIGNIFICANT CHANGE UP (ref 1–3.3)
LYMPHOCYTES # BLD AUTO: 9.9 % — LOW (ref 13–44)
MCHC RBC-ENTMCNC: 27.3 PG — SIGNIFICANT CHANGE UP (ref 27–34)
MCHC RBC-ENTMCNC: 32.9 G/DL — SIGNIFICANT CHANGE UP (ref 32–36)
MCV RBC AUTO: 83 FL — SIGNIFICANT CHANGE UP (ref 80–100)
MONOCYTES # BLD AUTO: 0.7 K/UL — SIGNIFICANT CHANGE UP (ref 0–0.9)
MONOCYTES NFR BLD AUTO: 6.4 % — SIGNIFICANT CHANGE UP (ref 2–14)
NEUTROPHILS # BLD AUTO: 8.99 K/UL — HIGH (ref 1.8–7.4)
NEUTROPHILS NFR BLD AUTO: 82.8 % — HIGH (ref 43–77)
NITRITE UR-MCNC: NEGATIVE — SIGNIFICANT CHANGE UP
NRBC BLD AUTO-RTO: 0 /100 WBCS — SIGNIFICANT CHANGE UP (ref 0–0)
NT-PROBNP SERPL-SCNC: 55 PG/ML — SIGNIFICANT CHANGE UP (ref 0–300)
PH UR: 5 — SIGNIFICANT CHANGE UP (ref 5–8)
PLATELET # BLD AUTO: 188 K/UL — SIGNIFICANT CHANGE UP (ref 150–400)
POTASSIUM SERPL-MCNC: 3.5 MMOL/L — SIGNIFICANT CHANGE UP (ref 3.5–5.3)
POTASSIUM SERPL-SCNC: 3.5 MMOL/L — SIGNIFICANT CHANGE UP (ref 3.5–5.3)
PROT SERPL-MCNC: 7.4 G/DL — SIGNIFICANT CHANGE UP (ref 6–8.3)
PROT UR-MCNC: NEGATIVE MG/DL — SIGNIFICANT CHANGE UP
PROTHROM AB SERPL-ACNC: 12.2 SEC — SIGNIFICANT CHANGE UP (ref 9.9–13.4)
RBC # BLD: 4.87 M/UL — SIGNIFICANT CHANGE UP (ref 3.8–5.2)
RBC # FLD: 13.1 % — SIGNIFICANT CHANGE UP (ref 10.3–14.5)
RBC CASTS # UR COMP ASSIST: 1 /HPF — SIGNIFICANT CHANGE UP (ref 0–4)
RSV RNA NPH QL NAA+NON-PROBE: SIGNIFICANT CHANGE UP
SARS-COV-2 RNA SPEC QL NAA+PROBE: SIGNIFICANT CHANGE UP
SODIUM SERPL-SCNC: 138 MMOL/L — SIGNIFICANT CHANGE UP (ref 135–145)
SOURCE RESPIRATORY: SIGNIFICANT CHANGE UP
SP GR SPEC: 1.01 — SIGNIFICANT CHANGE UP (ref 1–1.03)
SQUAMOUS # UR AUTO: 1 /HPF — SIGNIFICANT CHANGE UP (ref 0–5)
TROPONIN T, HIGH SENSITIVITY RESULT: <6 NG/L — SIGNIFICANT CHANGE UP (ref 0–51)
UROBILINOGEN FLD QL: 0.2 MG/DL — SIGNIFICANT CHANGE UP (ref 0.2–1)
WBC # BLD: 10.86 K/UL — HIGH (ref 3.8–10.5)
WBC # FLD AUTO: 10.86 K/UL — HIGH (ref 3.8–10.5)
WBC UR QL: 2 /HPF — SIGNIFICANT CHANGE UP (ref 0–5)

## 2025-04-29 PROCEDURE — 85730 THROMBOPLASTIN TIME PARTIAL: CPT

## 2025-04-29 PROCEDURE — 85014 HEMATOCRIT: CPT

## 2025-04-29 PROCEDURE — 85018 HEMOGLOBIN: CPT

## 2025-04-29 PROCEDURE — 83690 ASSAY OF LIPASE: CPT

## 2025-04-29 PROCEDURE — 87637 SARSCOV2&INF A&B&RSV AMP PRB: CPT

## 2025-04-29 PROCEDURE — 83735 ASSAY OF MAGNESIUM: CPT

## 2025-04-29 PROCEDURE — 80053 COMPREHEN METABOLIC PANEL: CPT

## 2025-04-29 PROCEDURE — 96374 THER/PROPH/DIAG INJ IV PUSH: CPT | Mod: XU

## 2025-04-29 PROCEDURE — 83605 ASSAY OF LACTIC ACID: CPT

## 2025-04-29 PROCEDURE — 83880 ASSAY OF NATRIURETIC PEPTIDE: CPT

## 2025-04-29 PROCEDURE — 93010 ELECTROCARDIOGRAM REPORT: CPT

## 2025-04-29 PROCEDURE — 71275 CT ANGIOGRAPHY CHEST: CPT | Mod: MC

## 2025-04-29 PROCEDURE — 82947 ASSAY GLUCOSE BLOOD QUANT: CPT

## 2025-04-29 PROCEDURE — 71275 CT ANGIOGRAPHY CHEST: CPT | Mod: 26

## 2025-04-29 PROCEDURE — 74177 CT ABD & PELVIS W/CONTRAST: CPT | Mod: 26

## 2025-04-29 PROCEDURE — 84295 ASSAY OF SERUM SODIUM: CPT

## 2025-04-29 PROCEDURE — 81001 URINALYSIS AUTO W/SCOPE: CPT

## 2025-04-29 PROCEDURE — 74177 CT ABD & PELVIS W/CONTRAST: CPT | Mod: MC

## 2025-04-29 PROCEDURE — 82803 BLOOD GASES ANY COMBINATION: CPT

## 2025-04-29 PROCEDURE — 84484 ASSAY OF TROPONIN QUANT: CPT

## 2025-04-29 PROCEDURE — 85025 COMPLETE CBC W/AUTO DIFF WBC: CPT

## 2025-04-29 PROCEDURE — 96375 TX/PRO/DX INJ NEW DRUG ADDON: CPT | Mod: XU

## 2025-04-29 PROCEDURE — 82330 ASSAY OF CALCIUM: CPT

## 2025-04-29 PROCEDURE — 87040 BLOOD CULTURE FOR BACTERIA: CPT

## 2025-04-29 PROCEDURE — 93005 ELECTROCARDIOGRAM TRACING: CPT

## 2025-04-29 PROCEDURE — 99285 EMERGENCY DEPT VISIT HI MDM: CPT | Mod: 25

## 2025-04-29 PROCEDURE — 82435 ASSAY OF BLOOD CHLORIDE: CPT

## 2025-04-29 PROCEDURE — 85610 PROTHROMBIN TIME: CPT

## 2025-04-29 PROCEDURE — 84132 ASSAY OF SERUM POTASSIUM: CPT

## 2025-04-29 RX ORDER — ACETAMINOPHEN 500 MG/5ML
1000 LIQUID (ML) ORAL ONCE
Refills: 0 | Status: COMPLETED | OUTPATIENT
Start: 2025-04-29 | End: 2025-04-29

## 2025-04-29 RX ORDER — SODIUM CHLORIDE 9 G/1000ML
1000 INJECTION, SOLUTION INTRAVENOUS ONCE
Refills: 0 | Status: COMPLETED | OUTPATIENT
Start: 2025-04-29 | End: 2025-04-29

## 2025-04-29 RX ORDER — DOXYCYCLINE HYCLATE 100 MG
100 TABLET ORAL ONCE
Refills: 0 | Status: COMPLETED | OUTPATIENT
Start: 2025-04-29 | End: 2025-04-29

## 2025-04-29 RX ORDER — DOXYCYCLINE HYCLATE 100 MG
1 TABLET ORAL
Qty: 14 | Refills: 0
Start: 2025-04-29 | End: 2025-05-05

## 2025-04-29 RX ADMIN — SODIUM CHLORIDE 1000 MILLILITER(S): 9 INJECTION, SOLUTION INTRAVENOUS at 04:26

## 2025-04-29 RX ADMIN — Medication 100 MILLIGRAM(S): at 04:57

## 2025-04-29 RX ADMIN — Medication 400 MILLIGRAM(S): at 04:26

## 2025-04-29 NOTE — ED PROVIDER NOTE - PHYSICAL EXAMINATION
Marcia Higgins MD (PGY-1)  Physical Exam:  Gen: NAD, AOx3  Head: NCAT  HEENT: EOMI, PEERLA  Lung: CTAB, no respiratory distress, no wheezes/rhonchi/rales B/L  CV: RRR, no murmurs, rubs or gallops  Abd: +Mild distension soft, NT, no guarding, no rigidity, no rebound tenderness, no CVA tenderness   MSK: no visible deformities, ROM normal in UE/LE  Neuro: No focal sensory or motor deficits. Sensation intact to light touch all extremities.  Skin: +Area of cellulitic appearing rash posterior to R. Knee cap, excoriations on right inner eblow, back from scratching Otherwise Warm, well perfused, no leg swelling  Psych: normal affect, calm Marcia Higgins MD (PGY-1)  Physical Exam:  Gen: NAD, AOx3  Head: NCAT  HEENT: EOMI, PEERLA  Lung: CTAB, no respiratory distress, no wheezes/rhonchi/rales B/L  CV: RRR, no murmurs, rubs or gallops  Abd: +Mild distension soft, NT, no guarding, no rigidity, no rebound tenderness, no CVA tenderness   MSK: no visible deformities, ROM normal in UE/LE  Neuro: No focal sensory or motor deficits. Sensation intact to light touch all extremities.  Skin: +Area of cellulitic appearing rash posterior to R. Knee cap, excoriations on right inner elbow, back from scratching Otherwise Warm, well perfused, no leg swelling  Psych: normal affect, calm

## 2025-04-29 NOTE — ED PROVIDER NOTE - ATTENDING CONTRIBUTION TO CARE
Lucius Live MD (Attending Physician):    I performed a history and physical exam of the patient and discussed their management with the resident/fellow/ACP/student. I have reviewed the resident/fellow/ACP/student note and agree with the documented findings and plan of care, except as noted. I have personally performed a substantive portion of the visit including all aspects of the medical decision making. My medical decision making and observations are found below. Please refer to any progress notes for updates on clinical course.    HPI:  59-year-old female with PMHx of HLD (non-compliant with Statins), presenting to the emergency department with complaints of fever, increased belching, cp, sob.  Patient states since last night and early this morning, she has been having increased episodes of belching.  Patient states she feels like her abdomen is more distended.  Denies any vomiting.  Patient states she had a low-grade fever at home.  Patient also endorsing some chest pain.  Denies any radiation of the chest pain. No numbness or tingling.  Denies any constipation. Last bowel movement was yesterday (wnl).  Patient denies any diarrhea.  Denies any chills.  Patient states she has been being treated for pityriasis rosacea, has been having increasing itching and states she has now developed scars from how intense the itching is.  Patient also noticed an area of increased redness behind her right knee.    PE:  Vitals noted  GEN - NAD, well appearing, A&Ox3  HEAD - NC/AT  EYES - PERRL, EOMI  ENT - Airway patent, mucous membranes moist  NECK - Supple, non-tender without lymphadenopathy, no masses  PULMONARY - Normal wob, CTA b/l, symmetric breath sounds, no W/R/R, satting 98% on RA  CARDIAC - +S1S2, RRR, no M/G/R, no JVD  ABDOMEN - +BS, +mildly distended, +mildly TTP diffusely, soft, no guarding, no rebound, no masses, no rigidity   - No CVA TTP b/l  EXTREMITIES - FROM, symmetric pulses, no edema. B/l calves NT.  SKIN - +Small area of erythema and induration on R popliteal fossa that is minimally TTP, no fluctuance. +Excoriations on right inner elbow.  NEUROLOGIC - Alert, speech clear, moving all extremities spontaneously, CN II-XII grossly intact, no pronator drift, normal gait, strength and sensation grossly intact, FTN negative b/l  PSYCH - Normal mood/affect, normal insight    MDM:  DDx includes, but not limited to: cellulitis, ACS, PE, PNA, PTX, viral syndrome, pancreatitis, gastritis, anemia, metabolic derangement, appendicitis, diverticulitis, kidney stone, UTI. ekg, cxr, CTA chest, CT a/p, labs, tylenol, IVF, IV abx. Dispo pending w/u.

## 2025-04-29 NOTE — ED PROVIDER NOTE - CLINICAL SUMMARY MEDICAL DECISION MAKING FREE TEXT BOX
Patient is a 59-year-old female PMH of HLD (non-compliant with Statins), presenting to the emergency department with complaints of fever, increased belching.  Patient states since last night and early this morning, she has been having increased episodes of belching.  Patient states she feels like her abdomen is more distended.  Denies any vomiting.  Patient states she had a low-grade fever at home.  Patient also endorsing some chest pain.  Denies any radiation of the chest pain, any numbness, tingling.  Denies any constipation, last bowel movement was normal and yesterday.  Patient denies any diarrhea.  Denies any chills.  Patient states she has been being treated for pityriasis rosacea, has been having increasing itching and states she has now developed scars from how intense the itching is.  Patient also noticed an area of increased redness behind her right knee.    In this patient who initially presented tachy, with complaints of SOB and Chest Pain considered PE. CTA of the Chest ordered. Abdominal distension, CT Abdomen and Pelvis. Plan: Labs, UA, Pain Control, Trop, EKG, CT Chest (PE Study) CT Abdomen and Pelvis    Given the timing of pain to ER presentation, plan to send single troponin  to evaluate for NSTEMI. Presentation not consistent with acute PE. pneumothorax, thoracic aortic dissection, cardiac effusion or tamponade. Abdominal exam without peritoneal signs. No evidence of acute abdomen at this time. Well appearing. Low suspicion for acute hepatobiliary disease (includng acute cholecystitis), acute pancreatitis, PUD (including perforation), acute infectious processes (pneumonia, hepatitis, pyelonephritis), acute appendicitis, vascular catastrophe, bowel obstruction or viscus perforation. Presentation not consistent with other acute, emergent causes of abdominal pain at this time.

## 2025-04-29 NOTE — ED PROVIDER NOTE - NSFOLLOWUPINSTRUCTIONS_ED_ALL_ED_FT
You presented to the emergency department today with abdominal distension, shortness of breath, chest pain, and increased belching. CT scans of your chest, abdomen, and pelvis were normal. Your urinalysis was also normal. While the cause of your abdominal symptoms is unclear at this time, you are being treated for cellulitis in the region behind your right knee.    Medications:  You have been prescribed doxycycline to treat the cellulitis behind your right knee. Take it twice a day for 7 days as directed. Common side effects of doxycycline can include:    Nausea  Vomiting  Diarrhea  Abdominal pain  Sensitivity to sunlight (sunburn)    Return to the Emergency Room immediately if you experience any of the following:  Worsening or severe abdominal pain  Increased shortness of breath  Chest pain that gets worse or changes  High fever (greater than 100.4°F or 38°C)  Chills  Spreading redness, swelling, or warmth around the area of cellulitis  Red streaks extending from the cellulitis  Dizziness or lightheadedness  Worsening of any of the symptoms you presented with today  Follow-up Care:    It is important to follow up with your primary care physician within 3 days, or sooner if your symptoms worsen. Discuss the abdominal symptoms you experienced, even though the CT scans were normal. Bring this discharge paperwork with you to your appointment.    For the cellulitis, keep the area clean and dry. Avoid tight clothing or anything that rubs against the affected skin. Monitor the area for any signs of worsening infection.    You may take over-the-counter pain relievers such as ibuprofen or acetaminophen for pain and discomfort, unless you have been told otherwise by your doctor. 75

## 2025-04-29 NOTE — ED PROVIDER NOTE - PATIENT PORTAL LINK FT
You can access the FollowMyHealth Patient Portal offered by [facility] by registering at the following website: [email or web address]. By joining [employer]’s FollowMyHealth portal, you will also be able to view your health information using other applications (apps) compatible with our system.

## 2025-04-29 NOTE — ED PROVIDER NOTE - OBJECTIVE STATEMENT
Patient is a 59-year-old female PMH of HLD (non-compliant with Statins), presenting to the emergency department with complaints of fever, increased belching.  Patient states since last night and early this morning, she has been having increased episodes of belching.  Patient states she feels like her abdomen is more distended.  Denies any vomiting.  Patient states she had a low-grade fever at home.  Patient also endorsing some chest pain.  Denies any radiation of the chest pain, any numbness, tingling.  Denies any constipation, last bowel movement was normal and yesterday.  Patient denies any diarrhea.  Denies any chills.  Patient states she has been being treated for pityriasis rosacea, has been having increasing itching and states she has now developed scars from how intense the itching is.  Patient also noticed an area of increased redness behind her right knee.

## 2025-04-29 NOTE — ED ADULT NURSE NOTE - OBJECTIVE STATEMENT
58 y/o female complaining of fevers at home. Pt is A&Ox4 breathing spontaneously speaking in full sentences with pmh of HLD, presents to the ED for evaluation of fevers, decreased po intake and nausea. PT denies chest pain, sob, dizziness, recent travel, falls or Diarrhea, vomiting. PT lung sounds clear and equal bilat abd soft nontender nondistended x4 quadrants. PT placed in patient gown bed in lowest position to maintain safety.

## 2025-04-29 NOTE — ED PROVIDER NOTE - PROGRESS NOTE DETAILS
Doxycycline given for cellulitic appearing region posterior to right knee. CTs show   No pulmonary embolism. No acute findings in the chest, abdomen, or pelvis. Urinalysis normal. Troponin less than 6.

## 2025-04-29 NOTE — ED PROVIDER NOTE - NS ED ROS FT
GENERAL: +fever  EYES: No change in vision  HEENT: +Painful Swallowing. No trouble swallowing or speaking  CARDIAC: +chest pain  PULMONARY: No cough or SOB  GI: +Abdominal distension, pain. No nausea or no vomiting, no diarrhea or constipation  : No changes in urination  SKIN: +Pityriasis Rosacea, excoriations from scratching  NEURO: No headache, no numbness  MSK: No joint pain  Otherwise as HPI or negative.

## 2025-04-29 NOTE — ED ADULT NURSE REASSESSMENT NOTE - NS ED NURSE REASSESS COMMENT FT1
Pt observed sitting up in stretcher conversing with RN without difficulty. Breathing spontaneous and unlabored. Pt updated on plan of care awaiting ct read and dispo. No acute distress noted. Call bell within reach. VSS.

## 2025-05-04 LAB
CULTURE RESULTS: SIGNIFICANT CHANGE UP
CULTURE RESULTS: SIGNIFICANT CHANGE UP
SPECIMEN SOURCE: SIGNIFICANT CHANGE UP
SPECIMEN SOURCE: SIGNIFICANT CHANGE UP